# Patient Record
Sex: FEMALE | Race: WHITE | NOT HISPANIC OR LATINO | Employment: STUDENT | URBAN - METROPOLITAN AREA
[De-identification: names, ages, dates, MRNs, and addresses within clinical notes are randomized per-mention and may not be internally consistent; named-entity substitution may affect disease eponyms.]

---

## 2018-07-18 ENCOUNTER — OFFICE VISIT (OUTPATIENT)
Dept: FAMILY MEDICINE CLINIC | Facility: CLINIC | Age: 23
End: 2018-07-18

## 2018-07-18 VITALS
SYSTOLIC BLOOD PRESSURE: 118 MMHG | DIASTOLIC BLOOD PRESSURE: 90 MMHG | BODY MASS INDEX: 41.36 KG/M2 | HEIGHT: 63 IN | RESPIRATION RATE: 20 BRPM | HEART RATE: 72 BPM | TEMPERATURE: 99 F | WEIGHT: 233.4 LBS

## 2018-07-18 DIAGNOSIS — R79.89 HIGH SERUM TESTOSTERONE: ICD-10-CM

## 2018-07-18 DIAGNOSIS — R20.0 BILATERAL HAND NUMBNESS: ICD-10-CM

## 2018-07-18 DIAGNOSIS — R79.89 LOW VITAMIN D LEVEL: ICD-10-CM

## 2018-07-18 DIAGNOSIS — Z91.09 ENVIRONMENTAL ALLERGIES: ICD-10-CM

## 2018-07-18 DIAGNOSIS — N91.1 SECONDARY AMENORRHEA: Primary | ICD-10-CM

## 2018-07-18 DIAGNOSIS — B35.1 ONYCHOMYCOSIS OF LEFT GREAT TOE: ICD-10-CM

## 2018-07-18 DIAGNOSIS — E53.8 LOW VITAMIN B12 LEVEL: ICD-10-CM

## 2018-07-18 DIAGNOSIS — R63.5 WEIGHT GAIN: ICD-10-CM

## 2018-07-18 PROCEDURE — 99203 OFFICE O/P NEW LOW 30 MIN: CPT | Performed by: FAMILY MEDICINE

## 2018-07-18 RX ORDER — FLUTICASONE PROPIONATE 50 MCG
2 SPRAY, SUSPENSION (ML) NASAL DAILY
COMMUNITY
End: 2018-07-18 | Stop reason: SDUPTHER

## 2018-07-18 RX ORDER — FLUTICASONE PROPIONATE 50 MCG
1 SPRAY, SUSPENSION (ML) NASAL DAILY
Qty: 18.2 ML | Refills: 5 | Status: SHIPPED | OUTPATIENT
Start: 2018-07-18

## 2018-07-18 NOTE — PROGRESS NOTES
Subjective   Danay Pineda is a 23 y.o. female.   Chief Complaint   Patient presents with   • Establish Care   • Amenorrhea   • Headache     migraines   • Abnormal Lab     Low vitamin levels and high testosterone levels   • Numbness     in her hands off/on   • Nail Problem     L big toe     History of Present Illness   History of migraines: Doesn't have them as frequently as she used to. Maybe 1-2 migraines per year. She does have headaches occasionally, which Aleve improves.     LMP: June 2018, she isn't sure of exact day. She reports that she has had 4 periods this year only. She was seeing an endocrinologist for this, treated with Provera. She changed her diet (Keto) in Jan 2018, lost 40 lbs, which is likely why she started having periods again.   She is no longer following with endocrinology. They recommended that she have weight loss surgery, but patient didn't feel that this   Mom reports that after she took Guardasil vaccination, within a year, she gained 100 lbs despite being active.   Currently not sexually active.   She did see a gynecologist during this, didn't feel that it was a gynecological issue and referred back to endocrinology.   She has history of high testosterone level, thinks it was checked 3 years ago. Thought it could be PCOS, but another doctor didn't think this was the issue.     Bilateral numbness in hands.   She has a history of multiple vitamin deficiencies, D, b12.     Left great toenail is discolored, thickened. It has been like this for at least 2 months. She hasn't tried anything to improve it.     The following portions of the patient's history were reviewed and updated as appropriate: allergies, current medications, past family history, past medical history, past social history, past surgical history and problem list.    Review of Systems   Constitutional: Negative for chills, fatigue and fever.   HENT: Negative for congestion, hearing loss and trouble swallowing.    Eyes:  Negative for pain and visual disturbance.   Respiratory: Negative for cough, chest tightness, shortness of breath and wheezing.    Cardiovascular: Negative for chest pain, palpitations and leg swelling.   Gastrointestinal: Negative for abdominal pain, blood in stool, nausea and vomiting.   Endocrine: Negative for cold intolerance and heat intolerance.   Genitourinary: Negative for dysuria and hematuria.   Musculoskeletal: Negative for gait problem.   Skin: Negative for rash.   Allergic/Immunologic: Positive for environmental allergies.   Neurological: Positive for numbness and headache. Negative for weakness and confusion.   Hematological: Negative for adenopathy. Does not bruise/bleed easily.   Psychiatric/Behavioral: Negative for agitation, self-injury, suicidal ideas and depressed mood. The patient is not nervous/anxious.        Objective   Physical Exam   Constitutional: She is oriented to person, place, and time. She appears well-developed and well-nourished.   HENT:   Head: Normocephalic and atraumatic.   Right Ear: Hearing, tympanic membrane, external ear and ear canal normal.   Left Ear: Hearing, tympanic membrane, external ear and ear canal normal.   Nose: Nose normal.   Mouth/Throat: Uvula is midline, oropharynx is clear and moist and mucous membranes are normal.   Eyes: Conjunctivae are normal.   Neck: Normal range of motion. Neck supple.   Cardiovascular: Normal rate, regular rhythm and normal heart sounds.    No murmur heard.  Pulmonary/Chest: Effort normal and breath sounds normal. She has no wheezes.   Musculoskeletal: She exhibits no edema or deformity.     Skin Integrity  - She has left foot onychomycosis (great toenail)..  Neurological: She is alert and oriented to person, place, and time.   Skin: Skin is warm and dry.   Psychiatric: She has a normal mood and affect. Her behavior is normal.   Nursing note and vitals reviewed.        Assessment/Plan   Danay was seen today for establish care,  amenorrhea, headache, abnormal lab, numbness and nail problem.    Diagnoses and all orders for this visit:    Secondary amenorrhea  -     CBC & Differential  -     Comprehensive Metabolic Panel  -     TSH  -     T4  -     DHEA-Sulfate  -     Insulin, Total  -     FSH & LH  -     Cortisol  -     ACTH  -     Testosterone    Bilateral hand numbness  -     CBC & Differential  -     Comprehensive Metabolic Panel  -     Celiac Disease Antibody Screen    Low vitamin D level  -     CBC & Differential  -     Comprehensive Metabolic Panel  -     Vitamin D 25 Hydroxy  -     Celiac Disease Antibody Screen    Low vitamin B12 level  -     CBC & Differential  -     Comprehensive Metabolic Panel  -     Vitamin B12  -     Celiac Disease Antibody Screen    High serum testosterone  -     CBC & Differential  -     Comprehensive Metabolic Panel  -     DHEA-Sulfate  -     Insulin, Total  -     Testosterone    Weight gain  -     Cortisol  -     ACTH    Onychomycosis of left great toe    Environmental allergies  -     fluticasone (FLONASE) 50 MCG/ACT nasal spray; 1 spray into each nostril Daily.  -     CBC & Differential  -     Comprehensive Metabolic Panel    BMI 40.0-44.9, adult (CMS/[facility])      Labs completed today  Will treat with Lamisil once labs are complete to improve left toenail onychomycosis.   Consider referred to endocrinology with her history of abnormal labs and amenorrhea. Will request records from Endocrinology at [facility] first.   She has had success with keto diet so far, encouraged to continue to help with additional weight loss.   Recommend that she schedule with gynecology to complete routine pap smear screening.

## 2018-07-18 NOTE — PATIENT INSTRUCTIONS
Go to the nearest ER or return to clinic if symptoms worsen, fever/chill develop      Fungal Nail Infection  Fungal nail infection is a common fungal infection of the toenails or fingernails. This condition affects toenails more often than fingernails. More than one nail may be infected. The condition can be passed from person to person (is contagious).  What are the causes?  This condition is caused by a fungus. Several types of funguses can cause the infection. These funguses are common in moist and warm areas. If your hands or feet come into contact with the fungus, it may get into a crack in your fingernail or toenail and cause the infection.  What increases the risk?  The following factors may make you more likely to develop this condition:  · Being male.  · Having diabetes.  · Being of older age.  · Living with someone who has the fungus.  · Walking barefoot in areas where the fungus thrives, such as showers or locker rooms.  · Having poor circulation.  · Wearing shoes and socks that cause your feet to sweat.  · Having athlete’s foot.  · Having a nail injury or history of a recent nail surgery.  · Having psoriasis.  · Having a weak body defense system (immune system).    What are the signs or symptoms?  Symptoms of this condition include:  · A pale spot on the nail.  · Thickening of the nail.  · A nail that becomes yellow or brown.  · A brittle or ragged nail edge.  · A crumbling nail.  · A nail that has lifted away from the nail bed.    How is this diagnosed?  This condition is diagnosed with a physical exam. Your health care provider may take a scraping or clipping from your nail to test for the fungus.  How is this treated?  Mild infections do not need treatment. If you have significant nail changes, treatment may include:  · Oral antifungal medicines. You may need to take the medicine for several weeks or several months, and you may not see the results for a long time. These medicines can cause side  effects. Ask your health care provider what problems to watch for.  · Antifungal nail polish and nail cream. These may be used along with oral antifungal medicines.  · Laser treatment of the nail.  · Surgery to remove the nail. This may be needed for the most severe infections.    Treatment takes a long time, and the infection may come back.  Follow these instructions at home:  Medicines  · Take or apply over-the-counter and prescription medicines only as told by your health care provider.  · Ask your health care provider about using over-the-counter mentholated ointment on your nails.  Lifestyle    · Do not share personal items, such as towels or nail clippers.  · Trim your nails often.  · Wash and dry your hands and feet every day.  · Wear absorbent socks, and change your socks frequently.  · Wear shoes that allow air to circulate, such as sandals or canvas tennis shoes. Throw out old shoes.  · Wear rubber gloves if you are working with your hands in wet areas.  · Do not walk barefoot in shower rooms or locker rooms.  · Do not use a nail salon that does not use clean instruments.  · Do not use artificial nails.  General instructions  · Keep all follow-up visits as told by your health care provider. This is important.  · Use antifungal foot powder on your feet and in your shoes.  Contact a health care provider if:  Your infection is not getting better or it is getting worse after several months.  This information is not intended to replace advice given to you by your health care provider. Make sure you discuss any questions you have with your health care provider.  Document Released: 12/15/2001 Document Revised: 05/25/2017 Document Reviewed: 06/20/2016  [employer] Patient Education © 2018 [employer]

## 2018-07-20 LAB
25(OH)D3+25(OH)D2 SERPL-MCNC: 24.5 NG/ML (ref 30–100)
ACTH PLAS-MCNC: 11.6 PG/ML (ref 7.2–63.3)
ALBUMIN SERPL-MCNC: 4.2 G/DL (ref 3.5–5.5)
ALBUMIN/GLOB SERPL: 1.6 {RATIO} (ref 1.2–2.2)
ALP SERPL-CCNC: 55 IU/L (ref 39–117)
ALT SERPL-CCNC: 11 IU/L (ref 0–32)
AST SERPL-CCNC: 11 IU/L (ref 0–40)
BASOPHILS # BLD AUTO: 0 X10E3/UL (ref 0–0.2)
BASOPHILS NFR BLD AUTO: 0 %
BILIRUB SERPL-MCNC: 0.5 MG/DL (ref 0–1.2)
BUN SERPL-MCNC: 12 MG/DL (ref 6–20)
BUN/CREAT SERPL: 15 (ref 9–23)
CALCIUM SERPL-MCNC: 9.4 MG/DL (ref 8.7–10.2)
CHLORIDE SERPL-SCNC: 103 MMOL/L (ref 96–106)
CO2 SERPL-SCNC: 24 MMOL/L (ref 20–29)
CORTIS SERPL-MCNC: 6.2 UG/DL
CREAT SERPL-MCNC: 0.79 MG/DL (ref 0.57–1)
DHEA-S SERPL-MCNC: 152.4 UG/DL (ref 110–431.7)
EOSINOPHIL # BLD AUTO: 0.3 X10E3/UL (ref 0–0.4)
EOSINOPHIL NFR BLD AUTO: 3 %
ERYTHROCYTE [DISTWIDTH] IN BLOOD BY AUTOMATED COUNT: 13.1 % (ref 12.3–15.4)
FSH SERPL-ACNC: 5.5 MIU/ML
GLIADIN PEPTIDE IGA SER-ACNC: 7 UNITS (ref 0–19)
GLOBULIN SER CALC-MCNC: 2.7 G/DL (ref 1.5–4.5)
GLUCOSE SERPL-MCNC: 96 MG/DL (ref 65–99)
HCT VFR BLD AUTO: 42.3 % (ref 34–46.6)
HGB BLD-MCNC: 14.5 G/DL (ref 11.1–15.9)
IGA SERPL-MCNC: 245 MG/DL (ref 87–352)
IMM GRANULOCYTES # BLD: 0 X10E3/UL (ref 0–0.1)
IMM GRANULOCYTES NFR BLD: 0 %
INSULIN SERPL-ACNC: 14.7 UIU/ML (ref 2.6–24.9)
LH SERPL-ACNC: 13.4 MIU/ML
LYMPHOCYTES # BLD AUTO: 2 X10E3/UL (ref 0.7–3.1)
LYMPHOCYTES NFR BLD AUTO: 21 %
MCH RBC QN AUTO: 31 PG (ref 26.6–33)
MCHC RBC AUTO-ENTMCNC: 34.3 G/DL (ref 31.5–35.7)
MCV RBC AUTO: 90 FL (ref 79–97)
MONOCYTES # BLD AUTO: 0.8 X10E3/UL (ref 0.1–0.9)
MONOCYTES NFR BLD AUTO: 8 %
NEUTROPHILS # BLD AUTO: 6.4 X10E3/UL (ref 1.4–7)
NEUTROPHILS NFR BLD AUTO: 68 %
PLATELET # BLD AUTO: 280 X10E3/UL (ref 150–379)
POTASSIUM SERPL-SCNC: 4.4 MMOL/L (ref 3.5–5.2)
PROT SERPL-MCNC: 6.9 G/DL (ref 6–8.5)
RBC # BLD AUTO: 4.68 X10E6/UL (ref 3.77–5.28)
SODIUM SERPL-SCNC: 140 MMOL/L (ref 134–144)
T4 SERPL-MCNC: 6.8 UG/DL (ref 4.5–12)
TESTOST SERPL-MCNC: 16 NG/DL (ref 8–48)
TSH SERPL DL<=0.005 MIU/L-ACNC: 1.58 UIU/ML (ref 0.45–4.5)
TTG IGA SER-ACNC: <2 U/ML (ref 0–3)
VIT B12 SERPL-MCNC: 511 PG/ML (ref 232–1245)
WBC # BLD AUTO: 9.5 X10E3/UL (ref 3.4–10.8)

## 2018-07-24 DIAGNOSIS — B35.1 ONYCHOMYCOSIS OF LEFT GREAT TOE: Primary | ICD-10-CM

## 2018-07-24 RX ORDER — TERBINAFINE HYDROCHLORIDE 250 MG/1
250 TABLET ORAL DAILY
Qty: 30 TABLET | Refills: 2 | Status: SHIPPED | OUTPATIENT
Start: 2018-07-24

## 2019-01-20 ENCOUNTER — RESULTS ENCOUNTER (OUTPATIENT)
Dept: FAMILY MEDICINE CLINIC | Facility: CLINIC | Age: 24
End: 2019-01-20

## 2019-01-20 DIAGNOSIS — B35.1 ONYCHOMYCOSIS OF LEFT GREAT TOE: ICD-10-CM
